# Patient Record
Sex: FEMALE | Race: WHITE | Employment: FULL TIME | URBAN - METROPOLITAN AREA
[De-identification: names, ages, dates, MRNs, and addresses within clinical notes are randomized per-mention and may not be internally consistent; named-entity substitution may affect disease eponyms.]

---

## 2023-02-28 ENCOUNTER — OFFICE VISIT (OUTPATIENT)
Dept: URGENT CARE | Facility: URGENT CARE | Age: 43
End: 2023-02-28
Payer: COMMERCIAL

## 2023-02-28 VITALS
HEIGHT: 69 IN | HEART RATE: 82 BPM | WEIGHT: 226 LBS | SYSTOLIC BLOOD PRESSURE: 138 MMHG | TEMPERATURE: 97.7 F | BODY MASS INDEX: 33.47 KG/M2 | RESPIRATION RATE: 16 BRPM | OXYGEN SATURATION: 98 % | DIASTOLIC BLOOD PRESSURE: 84 MMHG

## 2023-02-28 DIAGNOSIS — R47.9 SPEECH PROBLEM: ICD-10-CM

## 2023-02-28 DIAGNOSIS — R51.9 NONINTRACTABLE HEADACHE, UNSPECIFIED CHRONICITY PATTERN, UNSPECIFIED HEADACHE TYPE: ICD-10-CM

## 2023-02-28 DIAGNOSIS — H53.9 VISION CHANGES: Primary | ICD-10-CM

## 2023-02-28 PROCEDURE — 99204 OFFICE O/P NEW MOD 45 MIN: CPT | Performed by: FAMILY MEDICINE

## 2023-02-28 NOTE — PROGRESS NOTES
"SUBJECTIVE: Candice Rider is a 42 year old female presenting with a chief complaint of vision change with speech trouble and mild ha, lasting 20 mins but mild ha cont.  She gets these twice a year for years. Negative w/u including MRI ect.  Was told to go to ED but didn't. .  Course of illness is resolved vision issues and speech but mild ha cont.      No past medical history on file.  No Known Allergies  Social History     Tobacco Use     Smoking status: Former     Smokeless tobacco: Not on file   Substance Use Topics     Alcohol use: No       ROS:  SKIN: no rash  GI: no vomiting    OBJECTIVE:  /84   Pulse 82   Temp 97.7  F (36.5  C) (Temporal)   Resp 16   Ht 1.753 m (5' 9\")   Wt 102.5 kg (226 lb)   SpO2 98%   BMI 33.37 kg/m  GENERAL APPEARANCE: healthy, alert and no distress  EYES: EOMI,  PERRL, conjunctiva clear  HENT: ear canals and TM's normal.  Nose and mouth without ulcers, erythema or lesions  RESP: lungs clear to auscultation - no rales, rhonchi or wheezes  CV: regular rates and rhythm, normal S1 S2, no murmur noted  NEURO: Normal strength and tone, sensory exam grossly normal,  normal speech and mentation  SKIN: no suspicious lesions or rashes      ICD-10-CM    1. Vision changes  H53.9 Adult Neurology  Referral      2. Speech problem  R47.9 Adult Neurology  Referral      3. Nonintractable headache, unspecified chronicity pattern, unspecified headache type  R51.9 Adult Neurology  Referral        Pt declines going through ED for w/u and number for Neurology  ED if worse    "

## 2023-03-01 ENCOUNTER — OFFICE VISIT (OUTPATIENT)
Dept: NEUROLOGY | Facility: CLINIC | Age: 43
End: 2023-03-01
Attending: FAMILY MEDICINE
Payer: COMMERCIAL

## 2023-03-01 VITALS — SYSTOLIC BLOOD PRESSURE: 129 MMHG | DIASTOLIC BLOOD PRESSURE: 81 MMHG | HEART RATE: 63 BPM | OXYGEN SATURATION: 99 %

## 2023-03-01 DIAGNOSIS — R51.9 HEADACHE DISORDER: Primary | ICD-10-CM

## 2023-03-01 DIAGNOSIS — H53.9 VISION CHANGES: ICD-10-CM

## 2023-03-01 DIAGNOSIS — R29.818 TRANSIENT NEUROLOGICAL SYMPTOMS: ICD-10-CM

## 2023-03-01 PROCEDURE — 99205 OFFICE O/P NEW HI 60 MIN: CPT | Performed by: PSYCHIATRY & NEUROLOGY

## 2023-03-01 RX ORDER — OMEGA-3 FATTY ACIDS/FISH OIL 300-1000MG
200 CAPSULE ORAL EVERY 4 HOURS PRN
COMMUNITY

## 2023-03-01 RX ORDER — COVID-19 ANTIGEN TEST
220 KIT MISCELLANEOUS PRN
COMMUNITY
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
## 2023-03-01 NOTE — PATIENT INSTRUCTIONS
AFTER VISIT SUMMARY (AVS):    At today's visit we thoroughly discussed various diagnostic possibilities for your symptoms, necessary evaluation, and the plan, which includes:  Orders Placed This Encounter   Procedures    MR Brain w/o & w Contrast    MRA Head w/o Contrast Angiogram    MR Angiogram Neck w/o Contrast     No new medications.     Additional recommendations after the work-up.    Next follow-up appointment is in the next 2 weeks or earlier if needed.    Please do not hesitate to call me with any questions or concerns.    Thanks.

## 2023-03-01 NOTE — PROGRESS NOTES
"Candice Rider is a 42 year old female who presents for:  Chief Complaint   Patient presents with     Consult     Vision Change- patient is having blurry vision, with tingling fingers, and speech issues, then turns into a sever headache. Has happened 1-2 times yearly for the past 12 years         Initial Vitals:  /81 (BP Location: Right arm, Patient Position: Sitting, Cuff Size: Adult Regular)   Pulse 63   SpO2 99%  Estimated body mass index is 33.37 kg/m  as calculated from the following:    Height as of 2/28/23: 1.753 m (5' 9\").    Weight as of 2/28/23: 102.5 kg (226 lb).. There is no height or weight on file to calculate BSA. BP completed using cuff size: julian Chi  "

## 2023-03-02 ENCOUNTER — HOSPITAL ENCOUNTER (OUTPATIENT)
Dept: MRI IMAGING | Facility: HOSPITAL | Age: 43
Discharge: HOME | End: 2023-03-02
Attending: PSYCHIATRY & NEUROLOGY
Payer: COMMERCIAL

## 2023-03-02 DIAGNOSIS — R29.818 TRANSIENT NEUROLOGICAL SYMPTOMS: ICD-10-CM

## 2023-03-02 DIAGNOSIS — R51.9 HEADACHE DISORDER: ICD-10-CM

## 2023-03-02 PROCEDURE — 255N000002 HC RX 255 OP 636: Performed by: PSYCHIATRY & NEUROLOGY

## 2023-03-02 PROCEDURE — A9585 GADOBUTROL INJECTION: HCPCS | Performed by: PSYCHIATRY & NEUROLOGY

## 2023-03-02 PROCEDURE — 70544 MR ANGIOGRAPHY HEAD W/O DYE: CPT | Mod: XU

## 2023-03-02 PROCEDURE — 70553 MRI BRAIN STEM W/O & W/DYE: CPT

## 2023-03-02 PROCEDURE — 70549 MR ANGIOGRAPH NECK W/O&W/DYE: CPT

## 2023-03-02 RX ORDER — GADOBUTROL 604.72 MG/ML
0.1 INJECTION INTRAVENOUS ONCE
Status: COMPLETED | OUTPATIENT
Start: 2023-03-02 | End: 2023-03-02

## 2023-03-02 RX ADMIN — GADOBUTROL 10 ML: 604.72 INJECTION INTRAVENOUS at 18:50

## 2023-03-03 NOTE — PROGRESS NOTES
ESTABLISHED PATIENT NEUROLOGY NOTE    DATE OF VISIT: 3/6/2023  CLINIC LOCATION: [facility]  MRN: [id number]  PATIENT NAME: Candice Rider  YOB: 1980    REASON FOR VISIT: No chief complaint on file.    SUBJECTIVE:                                                      HISTORY OF PRESENT ILLNESS: Patient is here to follow up regarding transient episode of blurry vision/expressive language difficulty followed by severe headache on 2/28/2023 in context of recurrent episodes of blurry vision, right hand tingling, and dull headaches for the last 12 years. Please refer to my initial note from 3/1/2023 for further information.    Since the last visit, the patient reports ***.  She denies interval development of new focal neurological symptoms.    Brain MRI with and without contrast from 3/3/2022 ***.  Head and neck MRA were ***.  All images were personally reviewed and independently interpreted.  EXAM:                                                    Physical Exam:   Vitals: There were no vitals taken for this visit.    General: pt is in NAD, cooperative.  Skin: normal turgor, moist mucous membranes, no lesions/rashes noticed.  HEENT: ATNC, white sclera, normal conjunctiva.  Respiratory: Symmetric lung excursion, no accessory respiratory muscle use.  Abdomen: Non distended.  Neurological: awake, cooperative, follows commands, no aphasia or dysarthria noted, cranial nerves II-XII: no ptosis, extraocular motility is full, face is symmetric, tongue is midline, equally moves all extremities, normal tone in upper and lower extremities, sensation to the light touch is intact bilaterally, pronator drift is negative, finger to nose intact bilaterally, casual gait is normal.  ASSESSMENT AND PLAN:                                                    Assessment: 42 year old female patient presents for follow-up of transient episode of blurry vision/expressive language difficulty followed by severe  headache on 2/28/2023 in context of recurrent episodes of blurry vision, right hand tingling and dull headaches for the last 12 years after the completion of recommended work-up.  Her brain MRI ***.  Head and neck MRA ***.  We reviewed images together.  At the initial visit we discussed preventive verapamil along with as needed medication for acute therapy (Nurtec, Ubrelvy, or Reyvow because we have cannot use triptans or DHE).    Diagnoses:    ICD-10-CM    1. Vision changes  H53.9       2. Transient neurological symptoms  R29.818       3. Headache disorder  R51.9         Plan:  There are no Patient Instructions on file for this visit.    Total Time: *** minutes spent on the date of the encounter doing chart review, history and exam, documentation and further activities per the note.    Tani Bray MD  [facility] Neurology  (Chart documentation was completed in part with Dragon voice-recognition software. Even though reviewed, some grammatical, spelling, and word errors may remain.)

## 2023-03-06 ENCOUNTER — OFFICE VISIT (OUTPATIENT)
Dept: NEUROLOGY | Facility: CLINIC | Age: 43
End: 2023-03-06
Payer: COMMERCIAL

## 2023-03-06 VITALS
HEIGHT: 69 IN | HEART RATE: 84 BPM | DIASTOLIC BLOOD PRESSURE: 59 MMHG | WEIGHT: 226 LBS | SYSTOLIC BLOOD PRESSURE: 107 MMHG | BODY MASS INDEX: 33.47 KG/M2 | OXYGEN SATURATION: 98 %

## 2023-03-06 DIAGNOSIS — H53.9 VISION CHANGES: Primary | ICD-10-CM

## 2023-03-06 DIAGNOSIS — R51.9 HEADACHE DISORDER: ICD-10-CM

## 2023-03-06 DIAGNOSIS — R29.818 TRANSIENT NEUROLOGICAL SYMPTOMS: ICD-10-CM

## 2023-03-06 PROCEDURE — 99213 OFFICE O/P EST LOW 20 MIN: CPT | Performed by: PSYCHIATRY & NEUROLOGY

## 2023-03-06 NOTE — NURSING NOTE
"Candice Rider is a 42 year old female who presents for:  Chief Complaint   Patient presents with     Follow Up     Imaging review        Initial Vitals:  /59   Pulse 84   Ht 1.753 m (5' 9\")   Wt 102.5 kg (226 lb)   SpO2 98%   BMI 33.37 kg/m   Estimated body mass index is 33.37 kg/m  as calculated from the following:    Height as of this encounter: 1.753 m (5' 9\").    Weight as of this encounter: 102.5 kg (226 lb).. Body surface area is 2.23 meters squared. BP completed using cuff size: regular    Nursing Comments:     Rianna Flores    "

## 2023-03-06 NOTE — PROGRESS NOTES
"ESTABLISHED PATIENT NEUROLOGY NOTE    DATE OF VISIT: 3/6/2023  CLINIC LOCATION: [facility]  MRN: [id number]  PATIENT NAME: Candice Rider  YOB: 1980    REASON FOR VISIT:   Chief Complaint   Patient presents with     Follow Up     Imaging review     SUBJECTIVE:                                                      HISTORY OF PRESENT ILLNESS: Patient is here to follow up regarding transient episode of blurry vision/expressive language difficulty followed by severe headache on 2/28/2023 in context of recurrent episodes of blurry vision, right hand tingling, and dull headaches for the last 12 years. Please refer to my initial note from 3/1/2023 for further information.    Since the last visit, the patient reports no recurrence of her spells.  She denies interval development of new focal neurological symptoms.    Brain MRI with and without contrast from 3/3/2022 was unremarkable.  Head and neck MRA were normal.  All images were personally reviewed and independently interpreted.  EXAM:                                                    Physical Exam:   Vitals: /59   Pulse 84   Ht 1.753 m (5' 9\")   Wt 102.5 kg (226 lb)   SpO2 98%   BMI 33.37 kg/m      General: pt is in NAD, cooperative.  Skin: normal turgor, moist mucous membranes, no lesions/rashes noticed.  HEENT: ATNC, white sclera, normal conjunctiva.  Respiratory: Symmetric lung excursion, no accessory respiratory muscle use.  Abdomen: Non distended.  Neurological: awake, cooperative, follows commands, no aphasia or dysarthria noted, cranial nerves II-XII: no ptosis,  face is symmetric, equally moves all extremities, no dysmetria bilaterally, casual gait is normal.  ASSESSMENT AND PLAN:                                                    Assessment: 42 year old female patient presents for follow-up of transient episode of blurry vision/expressive language difficulty followed by severe headache on 2/28/2023 in context of " recurrent episodes of blurry vision, right hand tingling and dull headaches for the last 12 years after the completion of recommended work-up.  Her brain MRI was unrevealing.  Head and neck MRA are normal.  We reviewed images together.  At the initial visit we discussed preventive verapamil along with as needed medication for acute therapy (Nurtec, Ubrelvy, or Reyvow because we have cannot use triptans or DHE).  However, she states that her symptoms respond well to ibuprofen, and she is not interested in starting preventative medicine due to current low frequency of episodes.    Diagnoses:    ICD-10-CM    1. Vision changes  H53.9       2. Transient neurological symptoms  R29.818       3. Headache disorder  R51.9         Plan: At today's visit we thoroughly discussed current symptoms, MRI/MRI results, available treatment options, and the plan.    We decided to monitor her symptoms for recurrence.  She feels that ibuprofen helps with the headache, but if symptoms become more frequent or intense, we could discuss available prescription medications.    Next follow-up appointment is on as needed basis.    Total Time: 23 minutes spent on the date of the encounter doing chart review, history and exam, documentation and further activities per the note.    Tani Bray MD  [facility]  (Chart documentation was completed in part with Dragon voice-recognition software. Even though reviewed, some grammatical, spelling, and word errors may remain.)

## 2023-03-06 NOTE — PATIENT INSTRUCTIONS
AFTER VISIT SUMMARY (AVS):    At today's visit we thoroughly discussed current symptoms, MRI/MRI results, available treatment options, and the plan.    We decided to monitor symptoms for recurrence.  You feel that ibuprofen helps with the headache, but if symptoms become more frequent or intense, we could discuss available prescription medications.    Next follow-up appointment is on as needed basis.    Please do not hesitate to call me with any questions or concerns.    Thanks.

## 2023-04-23 ENCOUNTER — HEALTH MAINTENANCE LETTER (OUTPATIENT)
Age: 43
End: 2023-04-23

## 2024-02-17 ENCOUNTER — HEALTH MAINTENANCE LETTER (OUTPATIENT)
Age: 44
End: 2024-02-17

## 2024-04-27 ENCOUNTER — HEALTH MAINTENANCE LETTER (OUTPATIENT)
Age: 44
End: 2024-04-27

## 2025-05-11 ENCOUNTER — HEALTH MAINTENANCE LETTER (OUTPATIENT)
Age: 45
End: 2025-05-11